# Patient Record
Sex: FEMALE | Race: WHITE | NOT HISPANIC OR LATINO | URBAN - METROPOLITAN AREA
[De-identification: names, ages, dates, MRNs, and addresses within clinical notes are randomized per-mention and may not be internally consistent; named-entity substitution may affect disease eponyms.]

---

## 2021-03-24 ENCOUNTER — APPOINTMENT (RX ONLY)
Dept: URBAN - METROPOLITAN AREA CLINIC 44 | Facility: CLINIC | Age: 56
Setting detail: DERMATOLOGY
End: 2021-03-24

## 2021-03-24 ENCOUNTER — APPOINTMENT (RX ONLY)
Dept: URBAN - METROPOLITAN AREA CLINIC 45 | Facility: CLINIC | Age: 56
Setting detail: DERMATOLOGY
End: 2021-03-24

## 2021-03-24 DIAGNOSIS — M71 OTHER BURSOPATHIES: ICD-10-CM

## 2021-03-24 PROBLEM — M71.30 OTHER BURSAL CYST, UNSPECIFIED SITE: Status: ACTIVE | Noted: 2021-03-24

## 2021-03-24 PROCEDURE — 99202 OFFICE O/P NEW SF 15 MIN: CPT

## 2021-03-24 PROCEDURE — ? COUNSELING

## 2021-03-24 PROCEDURE — ? ADDITIONAL NOTES

## 2021-03-24 NOTE — HPI: SKIN LESIONS
Have Your Skin Lesions Been Treated?: not been treated
Is This A New Presentation, Or A Follow-Up?: Growth
How Severe Is Your Skin Lesion?: mild
Additional History: Patient stated that she has this soft nodule on her thumb that bothers her it doesn’t hurt just knowing it there bothers her.

## 2021-03-24 NOTE — PROCEDURE: ADDITIONAL NOTES
Render Risk Assessment In Note?: no
Detail Level: Detailed
Additional Notes: Discussed diagnosis.  Rec I&D.  Patient agreeable to risks of infection, pain, recurrence.  L thumb prox nailfoid subQ nodule was cleaned with HIBICLENS then incised with 22G then clear gelatinous fluid was expressed manually with CTAs.  Then cleaned again and cottonball / bandaid applied.  Patient should continue to keep clean and covered for at least 1 week.  I discussed there is a 50:50 chance of recurrence.

## 2021-03-24 NOTE — HPI: SKIN LESIONS
How Severe Is Your Skin Lesion?: mild
Have Your Skin Lesions Been Treated?: not been treated
Is This A New Presentation, Or A Follow-Up?: Growth
Additional History: Patient stated that she has this soft nodule on her thumb that bothers her it doesn’t hurt just knowing it there bothers her.

## 2021-03-24 NOTE — PROCEDURE: ADDITIONAL NOTES
Detail Level: Detailed
Additional Notes: Discussed diagnosis.  Rec I&D.  Patient agreeable to risks of infection, pain, recurrence.  L thumb prox nailfoid subQ nodule was cleaned with HIBICLENS then incised with 22G then clear gelatinous fluid was expressed manually with CTAs.  Then cleaned again and cottonball / bandaid applied.  Patient should continue to keep clean and covered for at least 1 week.  I discussed there is a 50:50 chance of recurrence.
Render Risk Assessment In Note?: no